# Patient Record
Sex: FEMALE | Race: WHITE
[De-identification: names, ages, dates, MRNs, and addresses within clinical notes are randomized per-mention and may not be internally consistent; named-entity substitution may affect disease eponyms.]

---

## 2019-12-25 ENCOUNTER — TRANSCRIPTION ENCOUNTER (OUTPATIENT)
Age: 64
End: 2019-12-25

## 2023-08-10 ENCOUNTER — APPOINTMENT (OUTPATIENT)
Dept: OTOLARYNGOLOGY | Facility: CLINIC | Age: 68
End: 2023-08-10
Payer: MEDICARE

## 2023-08-10 VITALS
HEART RATE: 65 BPM | BODY MASS INDEX: 28.12 KG/M2 | WEIGHT: 175 LBS | TEMPERATURE: 97.6 F | HEIGHT: 66 IN | DIASTOLIC BLOOD PRESSURE: 70 MMHG | SYSTOLIC BLOOD PRESSURE: 155 MMHG

## 2023-08-10 DIAGNOSIS — R09.82 POSTNASAL DRIP: ICD-10-CM

## 2023-08-10 DIAGNOSIS — R09.81 NASAL CONGESTION: ICD-10-CM

## 2023-08-10 PROCEDURE — 99204 OFFICE O/P NEW MOD 45 MIN: CPT | Mod: 25

## 2023-08-10 PROCEDURE — 42660 DILATION OF SALIVARY DUCT: CPT | Mod: LT

## 2023-08-10 PROCEDURE — 31231 NASAL ENDOSCOPY DX: CPT

## 2023-08-10 PROCEDURE — 42330 REMOVAL OF SALIVARY STONE: CPT | Mod: LT

## 2023-08-10 RX ORDER — UBIQUINOL 100 MG
CAPSULE ORAL
Refills: 0 | Status: ACTIVE | COMMUNITY

## 2023-08-10 RX ORDER — ANTIARTHRITIC COMBINATION NO.2 900 MG
TABLET ORAL
Refills: 0 | Status: ACTIVE | COMMUNITY

## 2023-08-10 RX ORDER — ATORVASTATIN CALCIUM 10 MG/1
10 TABLET, FILM COATED ORAL
Refills: 0 | Status: ACTIVE | COMMUNITY

## 2023-08-10 RX ORDER — AMLODIPINE BESYLATE 5 MG/1
5 TABLET ORAL
Refills: 0 | Status: ACTIVE | COMMUNITY

## 2023-08-10 RX ORDER — IPRATROPIUM BROMIDE 21 UG/1
0.03 SPRAY NASAL 3 TIMES DAILY
Qty: 1 | Refills: 2 | Status: ACTIVE | COMMUNITY
Start: 2023-08-10 | End: 1900-01-01

## 2023-08-10 RX ORDER — LEVOTHYROXINE SODIUM 0.11 MG/1
112 TABLET ORAL
Refills: 0 | Status: ACTIVE | COMMUNITY

## 2023-08-10 NOTE — PROCEDURE
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 He elected to proceed procedure -  started with massaging the punctum and stone was identified.  stone was visualized and small so after osme 4% lido topically was able to massage out very small stone. Got better flow, and pt didnt have swelling with sour candy but still had reduced flow vs right, i then uses a Storz punctum dilators dilated punctum 1 -5 followed the conical dilator to dilate the punctum -0 was severely stenotic in the distal duct by the punctum which was opened the rest of the duct was open and easily dilatated with lacrimal probes to size 4 without resistance.  minimal bleeding, tolerated well [FreeTextEntry6] : Procedure performed: Nasal Endoscopy- Diagnostic Pre-op indication(s): nasal congestion Post-op indication(s): nasal congestion  Verbal and/or written consent obtained from patient Anterior rhinoscopy insufficient to account for symptoms Scope #: 3,  flexible fiber optic telescope  The scope was introduced in the nasal passage between the middle and inferior turbinates to exam the inferior portion of the middle meatus and the fontanelle, as well as the maxillary ostia.  Next, the scope was passed medically and posteriorly to the middle turbinates to examine the sphenoethmoid recess and the superior turbinate region. Upon visualization the finders are as follows: Nasal Septum: L DNS Bilateral - Mucosa: reduced turbinates, Mucous: scant, Polyp: not seen, Inferior Turbinate: boggy, Middle Turbinate: normal, Superior Turbinate: normal, Inferior Meatus: narrow, Middle Meatus: wide open - able to see into all sinuses Super Meatus:normal, Sphenoethmoidal Recess: clear

## 2023-08-10 NOTE — END OF VISIT
[FreeTextEntry3] : I personally saw and examined  the patient in detail.  I spoke to MIREYA Marley regarding the assessment and plan of care. I performed the procedures and relevant physical exam.  I have reviewed the above assessment and plan of care and I agree.  I have made changes to the body of the note wherever necessary and appropriate

## 2023-08-10 NOTE — HISTORY OF PRESENT ILLNESS
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 Lasts 30 seconds or so. States occasoinally left underside of mouth gets engorged and salty, briney stuff comes out. Otherwise doesn't bother her. Denies taking antibiotic and steroids for this. Left eye is dry occasionally. Denies dry mouth.  Referred by Dr Norris and got allergy testing which was negative. Denies any ultrasounds or imaging.   Also with PND and nasal narrowing on left side. Also gets pressure from forehead on left which shoots to back of head. Does have history of sinus infections in past.

## 2023-08-10 NOTE — CONSULT LETTER
[Please see my note below.] : Please see my note below. [FreeTextEntry1] : Dear Dr. GIGI RG  I had the pleasure of evaluating your patient TAMIA ARNDT, thank you for allowing us to participate in their care. please see full note detailing our visit below. If you have any questions, please do not hesitate to call me and I would be happy to discuss further.   Paul Antoine M.D. Attending Physician,   Department of Otolaryngology - Head and Neck Surgery Former[facility]  Office: [phone] Fax: [phone]

## 2023-08-10 NOTE — PHYSICAL EXAM
[Normal] : mucosa is normal [Midline] : trachea located in midline position [de-identified] : reduced flow on left SMG, small stone in punctum of L SMG

## 2023-08-10 NOTE — ASSESSMENT
[FreeTextEntry1] : 68 year old female presents with left sided facial swelling. On exam, reduced flow on left SMG, small stone in punctum of L SMG and stenotic duct dilated with good resulting flow and resolution of sx  Will proceed with regiment to increase salivary flow to reduce pooling in the gland and washout any possible obstruction if possible. Recommended hydration, regular massage, sour candies, and warm compress  Also with chronic sinusitis and PND. On exam, L DNS. No evidence of acid reflux - can try ipratropium bromide for sneezing and runny nose.  - Dr Myrna lagunas for allergy testing, will follow up with him

## 2023-08-24 ENCOUNTER — APPOINTMENT (OUTPATIENT)
Dept: OTOLARYNGOLOGY | Facility: CLINIC | Age: 68
End: 2023-08-24
Payer: MEDICARE

## 2023-08-24 VITALS
SYSTOLIC BLOOD PRESSURE: 147 MMHG | TEMPERATURE: 97.6 F | HEART RATE: 59 BPM | WEIGHT: 175 LBS | DIASTOLIC BLOOD PRESSURE: 65 MMHG | HEIGHT: 66 IN | BODY MASS INDEX: 28.12 KG/M2

## 2023-08-24 DIAGNOSIS — K11.20 SIALOADENITIS, UNSPECIFIED: ICD-10-CM

## 2023-08-24 DIAGNOSIS — K11.5 SIALOLITHIASIS: ICD-10-CM

## 2023-08-24 PROCEDURE — 99213 OFFICE O/P EST LOW 20 MIN: CPT

## 2023-08-24 NOTE — CONSULT LETTER
[Please see my note below.] : Please see my note below. [FreeTextEntry1] : Dear Dr. GIGI RG  I had the pleasure of evaluating your patient TAMIA ARNDT, thank you for allowing us to participate in their care. please see full note detailing our visit below. If you have any questions, please do not hesitate to call me and I would be happy to discuss further.   Paul Antoine M.D. Attending Physician,   Department of Otolaryngology - Head and Neck Surgery Former[facility]  Office: [phone] Fax: [phone]

## 2023-08-24 NOTE — PHYSICAL EXAM
[Normal] : mucosa is normal [Midline] : trachea located in midline position [de-identified] : reduced flow on left SMG, small stone in punctum of L SMG

## 2023-08-24 NOTE — ASSESSMENT
[FreeTextEntry1] : 68 year old female presents with left sided facial swelling. On exam, reduced flow on left SMG, good on bimanual massage, offered redilation/serial dilation, she feels sx have by and large resolved and will let me know if any sx come back  Will proceed with regiment to increase salivary flow to reduce pooling in the gland and washout any possible obstruction if possible. Recommended hydration, regular massage, sour candies, and warm compress  Also with chronic sinusitis and PND. On exam, L DNS. No evidence of acid reflux - can try ipratropium bromide for sneezing and runny nose.  - Dr Myrna burns blood for allergy testing, will follow up with him

## 2023-08-24 NOTE — HISTORY OF PRESENT ILLNESS
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 Lasts 30 seconds or so. States occasoinally left underside of mouth gets engorged and salty, briney stuff comes out. Otherwise doesn't bother her. Denies taking antibiotic and steroids for this. Left eye is dry occasionally. Denies dry mouth.  Referred by Dr Norris and got allergy testing which was negative. Denies any ultrasounds or imaging.   Also with PND and nasal narrowing on left side. Also gets pressure from forehead on left which shoots to back of head. Does have history of sinus infections in past.   [FreeTextEntry1] : Patient following up for salivary gland after conservative management. States had salmon with lemon last night and left cheek got swollen more so than right. Doing well. Got allergy testing results back which was negative.  dilated last visit with resolution of sx

## 2024-12-03 ENCOUNTER — DOCTOR'S OFFICE (OUTPATIENT)
Facility: LOCATION | Age: 69
Setting detail: OPHTHALMOLOGY
End: 2024-12-03
Payer: MEDICARE

## 2024-12-03 DIAGNOSIS — H40.013: ICD-10-CM

## 2024-12-03 DIAGNOSIS — H26.491: ICD-10-CM

## 2024-12-03 DIAGNOSIS — H35.413: ICD-10-CM

## 2024-12-03 DIAGNOSIS — H25.13: ICD-10-CM

## 2024-12-03 DIAGNOSIS — H40.1131: ICD-10-CM

## 2024-12-03 DIAGNOSIS — H35.033: ICD-10-CM

## 2024-12-03 DIAGNOSIS — H35.40: ICD-10-CM

## 2024-12-03 DIAGNOSIS — H43.813: ICD-10-CM

## 2024-12-03 PROCEDURE — 92250 FUNDUS PHOTOGRAPHY W/I&R: CPT | Performed by: OPHTHALMOLOGY

## 2024-12-03 PROCEDURE — 92201 OPSCPY EXTND RTA DRAW UNI/BI: CPT | Performed by: OPHTHALMOLOGY

## 2024-12-03 PROCEDURE — 92014 COMPRE OPH EXAM EST PT 1/>: CPT | Performed by: OPHTHALMOLOGY

## 2024-12-03 ASSESSMENT — CONFRONTATIONAL VISUAL FIELD TEST (CVF)
OS_FINDINGS: FULL
OD_FINDINGS: FULL

## 2024-12-03 ASSESSMENT — TONOMETRY
OD_IOP_MMHG: 17
OS_IOP_MMHG: 17

## 2024-12-04 PROBLEM — H35.40 PERIPAPILLARY ATROPHY ; RIGHT EYE: Status: ACTIVE | Noted: 2024-12-03

## 2024-12-04 ASSESSMENT — VISUAL ACUITY
OS_BCVA: 20/20
OD_BCVA: 20/25

## 2025-04-01 ENCOUNTER — DOCTOR'S OFFICE (OUTPATIENT)
Facility: LOCATION | Age: 70
Setting detail: OPHTHALMOLOGY
End: 2025-04-01
Payer: MEDICARE

## 2025-04-01 DIAGNOSIS — H25.13: ICD-10-CM

## 2025-04-01 DIAGNOSIS — H40.013: ICD-10-CM

## 2025-04-01 DIAGNOSIS — H01.004: ICD-10-CM

## 2025-04-01 DIAGNOSIS — H35.033: ICD-10-CM

## 2025-04-01 DIAGNOSIS — H01.001: ICD-10-CM

## 2025-04-01 DIAGNOSIS — H40.1131: ICD-10-CM

## 2025-04-01 DIAGNOSIS — H35.413: ICD-10-CM

## 2025-04-01 DIAGNOSIS — H43.813: ICD-10-CM

## 2025-04-01 DIAGNOSIS — H26.491: ICD-10-CM

## 2025-04-01 DIAGNOSIS — H35.40: ICD-10-CM

## 2025-04-01 PROCEDURE — 99213 OFFICE O/P EST LOW 20 MIN: CPT | Performed by: OPHTHALMOLOGY

## 2025-04-01 PROCEDURE — 92134 CPTRZ OPH DX IMG PST SGM RTA: CPT | Performed by: OPHTHALMOLOGY

## 2025-04-01 PROCEDURE — 92201 OPSCPY EXTND RTA DRAW UNI/BI: CPT | Performed by: OPHTHALMOLOGY

## 2025-04-01 ASSESSMENT — VISUAL ACUITY
OD_BCVA: 20/25+2
OS_BCVA: 20/25

## 2025-04-01 ASSESSMENT — TONOMETRY
OD_IOP_MMHG: 16
OS_IOP_MMHG: 16

## 2025-04-01 ASSESSMENT — LID EXAM ASSESSMENTS
OS_BLEPHARITIS: LUL T
OD_BLEPHARITIS: RUL T

## 2025-04-01 ASSESSMENT — CONFRONTATIONAL VISUAL FIELD TEST (CVF)
OD_FINDINGS: FULL
OS_FINDINGS: FULL